# Patient Record
Sex: FEMALE
[De-identification: names, ages, dates, MRNs, and addresses within clinical notes are randomized per-mention and may not be internally consistent; named-entity substitution may affect disease eponyms.]

---

## 2022-04-26 PROBLEM — R25.2 CRAMPING OF HANDS: Status: ACTIVE | Noted: 2018-04-02

## 2022-04-26 PROBLEM — H43.391 VITREOUS FLOATER, RIGHT: Status: ACTIVE | Noted: 2021-03-12

## 2022-04-26 PROBLEM — R35.1 NOCTURIA: Status: ACTIVE | Noted: 2022-04-26

## 2022-04-26 PROBLEM — H04.123 DRY EYE SYNDROME OF BOTH EYES: Status: ACTIVE | Noted: 2021-03-12

## 2022-04-26 PROBLEM — R82.2 BILIRUBINURIA: Status: ACTIVE | Noted: 2022-04-26

## 2022-04-26 PROBLEM — G89.0 CENTRAL PAIN SYNDROME: Status: ACTIVE | Noted: 2018-04-02

## 2022-04-26 PROBLEM — I10 ESSENTIAL HYPERTENSION: Status: ACTIVE | Noted: 2018-03-04

## 2022-04-26 PROBLEM — E11.9 DM TYPE 2 WITHOUT RETINOPATHY (HCC): Status: ACTIVE | Noted: 2020-03-12

## 2022-04-26 PROBLEM — E11.00 HYPEROSMOLAR NON-KETOTIC STATE IN PATIENT WITH TYPE 2 DIABETES MELLITUS (HCC): Status: ACTIVE | Noted: 2018-03-04

## 2022-04-26 PROBLEM — E11.65 TYPE 2 DIABETES MELLITUS WITH HYPERGLYCEMIA, WITHOUT LONG-TERM CURRENT USE OF INSULIN (HCC): Status: ACTIVE | Noted: 2018-03-04

## 2022-04-26 PROBLEM — M47.812 SPONDYLOSIS OF CERVICAL REGION WITHOUT MYELOPATHY OR RADICULOPATHY: Status: ACTIVE | Noted: 2018-04-02

## 2022-04-26 PROBLEM — G24.9 DYSTONIA: Status: ACTIVE | Noted: 2018-04-02

## 2022-04-26 PROBLEM — N28.1 RENAL CYST: Status: ACTIVE | Noted: 2022-04-26

## 2022-04-26 PROBLEM — H47.333: Status: ACTIVE | Noted: 2021-03-12

## 2022-04-26 PROBLEM — H25.13 AGE-RELATED NUCLEAR CATARACT OF BOTH EYES: Status: ACTIVE | Noted: 2020-03-12

## 2022-04-26 PROBLEM — R10.9 FLANK PAIN: Status: ACTIVE | Noted: 2022-04-26

## 2023-03-08 ENCOUNTER — HOSPITAL ENCOUNTER (OUTPATIENT)
Facility: HOSPITAL | Age: 58
Setting detail: RECURRING SERIES
Discharge: HOME | End: 2023-03-11
Payer: COMMERCIAL

## 2023-03-08 PROCEDURE — 97161 PT EVAL LOW COMPLEX 20 MIN: CPT

## 2023-03-08 NOTE — PROGRESS NOTES
[facility]   [address], [address] [facility], [address] [facility]   Phone: [phone]     Fax: [phone]       Plan of Care/ Statement of Necessity for Physical Therapy Services    Patient name: Gilbert Black Start of Care: 3/8/2023   Referral source: Len Schwab MD : 1965    Medical Diagnosis: No admission diagnoses are documented for this encounter. Onset Date:3/8/22    Treatment Diagnosis: R39.89  Other signs and symptoms of genitourinary system and N90.89  OTHER SPECIFIED NONINFLAMMATORY DISORDERS OF VULVA AND PERINEUM                            Prior Hospitalization: see medical history Provider#: [id number]   Medications: Verified on Patient Summary List   Comorbidities: DM. HTN. Thyroid issues. Gallbladder resection. Stroke hx/  Prior Level of Function: Pt reports that she used to have difficulty with nocturia and increased urinary urgency but that it has been well managed since last pelvic PT POC a few months ago      The Plan of Care and following information is based on the information from the initial evaluation. Assessment/ key information: Pt was referred to PT to address nocturia and increased urinary urgency and frequency. She states that she just finished pelvic PT for this issue at a different clinic and her symptoms are well managed using HEP from that  [address]. She states that she is unsure why she is being referred to physical therapy again for this issue. Educated patient on role of pelvic PT in addressing secondary complaints including back pain and potential role in remaining urinary sxs; she declines and is not interested in participating in PT at this time. Educated pt re: bladder habits, nocturia management strategies. Pt not appropriate for PT at this time due to complaint fully managed independently.    Evaluation Complexity HistoryHIGH Complexity :3+ comorbidities / personal factors will impact the outcome/ POC  ; Examination LOW Complexity : 1-2 Standardized tests and measures addressing body structure, function, activity limitation and / or participation in recreation  ;Presentation LOW Complexity : Stable, uncomplicated  ;Clinical Decision Making LOW Complexity : FOTO score of  FOTO score = an established functional score where 100 = no disability  Overall Complexity Rating: LOW   Problem List: decrease flexibility/joint mobility   Treatment Plan may include any combination of the following: none, patient not appropriate for PT   Patient / Family readiness to learn indicated by: return verbalization   Persons(s) to be included in education: patient (P)  Barriers to Learning/Limitations: None  Measures taken if barriers to learning present: NA  Patient Goal (s): no goals, patient states that her symptoms are fully managed on hher own  Patient Self Reported Health Status: fair  Rehabilitation Potential: good    GOALS     None- patient not appropriate for PT due to primary complaint fully resolved. Frequency / Duration: patient not appropriate for PT    Patient/ Caregiver education and instruction: Diagnosis, prognosis, self care, activity modification, and exercises    [x]  Plan of care has been reviewed with PTA    Certification Period: JESUS Maria, PT 3/8/2023 8:32 AM  _____________________________________________________________________  I certify that the above Therapy Services are being furnished while the patient is under my care. I agree with the treatment plan and certify that this therapy is necessary. Physician's Signature:________________________Date:_________TIME:________                                      Len Schwab MD         ** Signature, Date and Time must be completed for valid certification **    Insurance: Payor: [id number] / Plan: [address] / Product Type: *No Product type* /      Please sign and return to :   [facility] [address], [place] [address]           Phone: [phone]      Fax:  [phone]

## 2023-03-08 NOTE — PROGRESS NOTES
PT DAILY TREATMENT NOTE/Pelvic EVAL     Patient Name: Maynor Adamson    Date: 3/8/2023    : 1965  Insurance: Payor: [address] [place] [address] [address] / Plan: [address] [place] [address] Road / Product Type: *No Product type* /      Patient  verified yes     Visit #   Current / Total 1 1   Time   In / Out 8:30am 9:30am   Pain   In / Out 0    Subjective Functional Status/Changes: See below   Changes to:  Meds, Allergies, Med Hx, Sx Hx? If yes, update Summary List no       Treatment Area: No admission diagnoses are documented for this encounter. SUBJECTIVE  []constant []intermittent []improving []worsening []no change since onset    Any medication changes, allergies to medications, adverse drug reactions, diagnosis change, or new procedure performed?: [x] No    [] Yes (see summary sheet for update)  Subjective functional status/changes:     Current complaint/symptoms:    Pt c/o nocturia and increased urinary urgency and frequency. Went to PT before for the same issue and it was getting a little better and she is not sure why urologist referred her here. States that she finished PT and does kegel exercises and cut back on liquids per advice she learned there. Worse when her back is hurting a lot; has stenosis on right side. Sxs wrap around the outside of her leg and to the front of her thigh. Went to PT for that as well and the symptoms got worse  Sometimes she gets urinary urge to wake up all night. Other nights she doesn't get signal and wets the bed. Able to delay urge to urinate for 1-2 min, day or night  3-5 glasses of water during the day. Tries to drink more  Used to walk around a lot but unable to 2/2 stenosis sxs. Doing laundry and dishes make it worse. Feels like pulling or giving way in posterior right pelvis. HODA/onset of symptoms: see above  Functional limitations: see above    Comorbidities: DM. HTN. Thyroid issues. Gallbladder resection.  Stroke hx/  Prior Level of Function: Pt reports that she used to have difficulty with nocturia and increased urinary urgency but that it has been well managed since last pelvic PT POC a few months ago    Previous treatment/compliance: already received pelvic PT for this issue and symptoms resolved  Work status: not working currently   Living situation:  WNL    Treatment goals/concerns: none  Self-reported health status: fair    Barriers to care: pt does not want to continue with PT   Substance use: tobacco  Cognition: A&O x 4     OBJECTIVE/EXAMINATION         60 min [x]Eval        - untimed        Therapeutic Procedures: Tx Min Billable or 1:1 Min (if diff from [employer]) Procedure, Rationale, Specifics          Details if applicable:              Details if applicable:            Details if applicable:            Details if applicable:            Details if applicable:     0  MC BC Totals Reminder: bill using total billable min of TIMED therapeutic procedures (example: do not include dry needle or estim unattended, both untimed codes, in totals to left)  8-22 min = 1 unit; 23-37 min = 2 units; 38-52 min = 3 units; 53-67 min = 4 units; 68-82 min = 5 units   Total Total     [x]  Patient Education billed concurrently with other procedures     Physical Therapy Evaluation- Pelvic  Musculoskeletal Screen:    Posture: Forward head, Increased thoracic kyphosis, Scapular protraction, Increased lumbar lordosis, and Increased anterior pelvic tilt    ASSESSMENT/Changes in Function: Pt was referred to PT to address nocturia and increased urinary urgency and frequency. She states that she just finished pelvic PT for this issue at a different clinic and her symptoms are well managed using HEP from that [address]. She states that she is unsure why she is being referred to physical therapy again for this issue.  Educated patient on role of pelvic PT in addressing secondary complaints including back pain and potential role in remaining urinary sxs; she declines and is not interested in participating in PT at this time. Educated pt re: bladder habits, nocturia management strategies. Pt not appropriate for PT at this time due to complaint fully managed independently. [x]  See Plan of Care  []  See progress note/recertification  [x]  See Discharge Summary         GOALS    None- patient not appropriate for PT due to primary complaint fully resolved.       PLAN  [x]  Upgrade activities as tolerated     [x]  Continue plan of care  [x]  Update interventions per flow sheet       []  Discharge due to:_  []  Other:_      Nacho Dailey, PT 3/8/2023  8:29 AM

## 2023-03-09 NOTE — PROGRESS NOTES
[facility] at the [address], [place], [address] [facility]  Phone: [phone]      Fax:  [phone]         Discharge Summary    Patient name: Bereket Gallagher Start of Care: 3/8/2023   Referral source: Jenn Torrez MD : 1965               Medical Diagnosis: No admission diagnoses are documented for this encounter. Onset Date:3/8/22               Treatment Diagnosis: R39.89  Other signs and symptoms of genitourinary system and N90.89  OTHER SPECIFIED NONINFLAMMATORY DISORDERS OF VULVA AND PERINEUM                            Prior Hospitalization: see medical history Provider#: [id number]   Medications: Verified on Patient Summary List   Comorbidities: DM. HTN. Thyroid issues. Gallbladder resection. Stroke hx/  Prior Level of Function: Pt reports that she used to have difficulty with nocturia and increased urinary urgency but that it has been well managed since last pelvic PT POC a few months ago      Visits from Start of Care: 0    Missed Visits: 0    Reporting Period : 3/8/23 to 3/8/23    Goals/Measure of Progress:  None- patient not appropriate for PT due to primary complaint fully resolved. Assessment/ Summary of Care:  Pt was referred to PT to address nocturia and increased urinary urgency and frequency. She states that she just finished pelvic PT for this issue at a different clinic and her symptoms are well managed using HEP from that  [facility] [address]. She states that she is unsure why she is being referred to physical therapy again for this issue. Educated patient on role of pelvic PT in addressing secondary complaints including back pain and potential role in remaining urinary sxs; she declines and is not interested in participating in PT at this time. Educated pt re: bladder habits, nocturia management strategies. Pt not appropriate for PT at this time due to complaint fully managed independently.      RECOMMENDATIONS:  [x]Discontinue therapy: []Patient has reached or is progressing toward set goals      [x]Patient is non-compliant or has abdicated      []Due to lack of appreciable progress towards set goals    Edson Houser, PT 3/9/2023 12:38 PM    NOTE TO PHYSICIAN:  Please complete the following and fax to: [facility] at [phone]  Retain this original for your records. If you are unable to process this request in   24 hours, please contact our office.      [] I have read the above report and request that my patient continue therapy with the following changes/special instructions:  [] I have read the above report and request that my patient be discharged from therapy    Physician's Signature:____________Date:_________TIME:________  Donnell San MD    ** Signature, Date and Time must be completed for valid certification **

## 2024-11-27 ENCOUNTER — HOSPITAL ENCOUNTER (OUTPATIENT)
Facility: HOSPITAL | Age: 59
Discharge: HOME | End: 2024-11-30
Payer: MEDICARE

## 2024-11-27 DIAGNOSIS — Z01.818 PRE-OP TESTING: Primary | ICD-10-CM

## 2024-11-27 LAB
ALBUMIN SERPL-MCNC: 3.6 G/DL (ref 3.4–5)
ALBUMIN/GLOB SERPL: 0.9 (ref 0.8–1.7)
ALP SERPL-CCNC: 95 U/L (ref 45–117)
ALT SERPL-CCNC: 26 U/L (ref 13–56)
ANION GAP SERPL CALC-SCNC: 5 MMOL/L (ref 3–18)
AST SERPL-CCNC: 22 U/L (ref 10–38)
BASOPHILS # BLD: 0 K/UL (ref 0–0.1)
BASOPHILS NFR BLD: 0 % (ref 0–2)
BILIRUB SERPL-MCNC: 0.7 MG/DL (ref 0.2–1)
BUN SERPL-MCNC: 14 MG/DL (ref 7–18)
BUN/CREAT SERPL: 13 (ref 12–20)
CALCIUM SERPL-MCNC: 9.2 MG/DL (ref 8.5–10.1)
CHLORIDE SERPL-SCNC: 106 MMOL/L (ref 100–111)
CO2 SERPL-SCNC: 33 MMOL/L (ref 21–32)
CREAT SERPL-MCNC: 1.07 MG/DL (ref 0.6–1.3)
DIFFERENTIAL METHOD BLD: ABNORMAL
EOSINOPHIL # BLD: 0.1 K/UL (ref 0–0.4)
EOSINOPHIL NFR BLD: 1 % (ref 0–5)
ERYTHROCYTE [DISTWIDTH] IN BLOOD BY AUTOMATED COUNT: 14.2 % (ref 11.6–14.5)
EST. AVERAGE GLUCOSE BLD GHB EST-MCNC: 148 MG/DL
GLOBULIN SER CALC-MCNC: 4.2 G/DL (ref 2–4)
GLUCOSE SERPL-MCNC: 131 MG/DL (ref 74–99)
HBA1C MFR BLD: 6.8 % (ref 4.2–5.6)
HCT VFR BLD AUTO: 39.5 % (ref 35–45)
HGB BLD-MCNC: 13.4 G/DL (ref 12–16)
IMM GRANULOCYTES # BLD AUTO: 0 K/UL (ref 0–0.04)
IMM GRANULOCYTES NFR BLD AUTO: 0 % (ref 0–0.5)
LYMPHOCYTES # BLD: 2 K/UL (ref 0.9–3.6)
LYMPHOCYTES NFR BLD: 21 % (ref 21–52)
MCH RBC QN AUTO: 30.2 PG (ref 24–34)
MCHC RBC AUTO-ENTMCNC: 33.9 G/DL (ref 31–37)
MCV RBC AUTO: 89.2 FL (ref 78–100)
MONOCYTES # BLD: 0.7 K/UL (ref 0.05–1.2)
MONOCYTES NFR BLD: 7 % (ref 3–10)
NEUTS SEG # BLD: 6.8 K/UL (ref 1.8–8)
NEUTS SEG NFR BLD: 71 % (ref 40–73)
NRBC # BLD: 0 K/UL (ref 0–0.01)
NRBC BLD-RTO: 0 PER 100 WBC
PLATELET # BLD AUTO: 175 K/UL (ref 135–420)
PMV BLD AUTO: 12 FL (ref 9.2–11.8)
POTASSIUM SERPL-SCNC: 3 MMOL/L (ref 3.5–5.5)
PROT SERPL-MCNC: 7.8 G/DL (ref 6.4–8.2)
RBC # BLD AUTO: 4.43 M/UL (ref 4.2–5.3)
SODIUM SERPL-SCNC: 144 MMOL/L (ref 136–145)
WBC # BLD AUTO: 9.6 K/UL (ref 4.6–13.2)

## 2024-11-27 PROCEDURE — 83036 HEMOGLOBIN GLYCOSYLATED A1C: CPT

## 2024-11-27 PROCEDURE — 85025 COMPLETE CBC W/AUTO DIFF WBC: CPT

## 2024-11-27 PROCEDURE — 93005 ELECTROCARDIOGRAM TRACING: CPT | Performed by: ORTHOPAEDIC SURGERY

## 2024-11-27 PROCEDURE — 80053 COMPREHEN METABOLIC PANEL: CPT

## 2024-11-28 LAB
EKG ATRIAL RATE: 62 BPM
EKG DIAGNOSIS: NORMAL
EKG P AXIS: 72 DEGREES
EKG P-R INTERVAL: 154 MS
EKG Q-T INTERVAL: 420 MS
EKG QRS DURATION: 76 MS
EKG QTC CALCULATION (BAZETT): 426 MS
EKG R AXIS: 76 DEGREES
EKG T AXIS: 49 DEGREES
EKG VENTRICULAR RATE: 62 BPM

## 2024-12-03 ENCOUNTER — ANESTHESIA EVENT (OUTPATIENT)
Facility: HOSPITAL | Age: 59
End: 2024-12-03
Payer: MEDICARE

## 2024-12-09 ENCOUNTER — ANESTHESIA (OUTPATIENT)
Facility: HOSPITAL | Age: 59
End: 2024-12-09
Payer: MEDICARE

## 2024-12-09 ENCOUNTER — HOSPITAL ENCOUNTER (OUTPATIENT)
Facility: HOSPITAL | Age: 59
Setting detail: OUTPATIENT SURGERY
Discharge: HOME | End: 2024-12-09
Attending: ORTHOPAEDIC SURGERY | Admitting: ORTHOPAEDIC SURGERY
Payer: MEDICARE

## 2024-12-09 VITALS
SYSTOLIC BLOOD PRESSURE: 139 MMHG | WEIGHT: 208.19 LBS | HEART RATE: 76 BPM | RESPIRATION RATE: 15 BRPM | DIASTOLIC BLOOD PRESSURE: 59 MMHG | OXYGEN SATURATION: 95 % | HEIGHT: 66 IN | BODY MASS INDEX: 33.46 KG/M2 | TEMPERATURE: 97 F

## 2024-12-09 DIAGNOSIS — M23.204 OLD COMPLEX TEAR OF MEDIAL MENISCUS OF LEFT KNEE: Primary | Chronic | ICD-10-CM

## 2024-12-09 LAB
GLUCOSE BLD STRIP.AUTO-MCNC: 164 MG/DL (ref 70–110)
GLUCOSE BLD STRIP.AUTO-MCNC: 175 MG/DL (ref 70–110)
POTASSIUM SERPL-SCNC: 3 MMOL/L (ref 3.5–5.5)

## 2024-12-09 PROCEDURE — 2709999900 HC NON-CHARGEABLE SUPPLY: Performed by: ORTHOPAEDIC SURGERY

## 2024-12-09 PROCEDURE — 6360000002 HC RX W HCPCS

## 2024-12-09 PROCEDURE — 3600000002 HC SURGERY LEVEL 2 BASE: Performed by: ORTHOPAEDIC SURGERY

## 2024-12-09 PROCEDURE — 7100000011 HC PHASE II RECOVERY - ADDTL 15 MIN: Performed by: ORTHOPAEDIC SURGERY

## 2024-12-09 PROCEDURE — 84132 ASSAY OF SERUM POTASSIUM: CPT

## 2024-12-09 PROCEDURE — 82962 GLUCOSE BLOOD TEST: CPT

## 2024-12-09 PROCEDURE — 3600000012 HC SURGERY LEVEL 2 ADDTL 15MIN: Performed by: ORTHOPAEDIC SURGERY

## 2024-12-09 PROCEDURE — 2580000003 HC RX 258: Performed by: ANESTHESIOLOGY

## 2024-12-09 PROCEDURE — 3700000001 HC ADD 15 MINUTES (ANESTHESIA): Performed by: ORTHOPAEDIC SURGERY

## 2024-12-09 PROCEDURE — 7100000001 HC PACU RECOVERY - ADDTL 15 MIN: Performed by: ORTHOPAEDIC SURGERY

## 2024-12-09 PROCEDURE — 2580000003 HC RX 258: Performed by: ORTHOPAEDIC SURGERY

## 2024-12-09 PROCEDURE — 6360000002 HC RX W HCPCS: Performed by: ORTHOPAEDIC SURGERY

## 2024-12-09 PROCEDURE — 7100000000 HC PACU RECOVERY - FIRST 15 MIN: Performed by: ORTHOPAEDIC SURGERY

## 2024-12-09 PROCEDURE — 36415 COLL VENOUS BLD VENIPUNCTURE: CPT

## 2024-12-09 PROCEDURE — 6360000002 HC RX W HCPCS: Performed by: NURSE ANESTHETIST, CERTIFIED REGISTERED

## 2024-12-09 PROCEDURE — 2500000003 HC RX 250 WO HCPCS

## 2024-12-09 PROCEDURE — 3700000000 HC ANESTHESIA ATTENDED CARE: Performed by: ORTHOPAEDIC SURGERY

## 2024-12-09 PROCEDURE — 7100000010 HC PHASE II RECOVERY - FIRST 15 MIN: Performed by: ORTHOPAEDIC SURGERY

## 2024-12-09 RX ORDER — HYDROMORPHONE HYDROCHLORIDE 1 MG/ML
0.5 INJECTION, SOLUTION INTRAMUSCULAR; INTRAVENOUS; SUBCUTANEOUS EVERY 5 MIN PRN
Status: DISCONTINUED | OUTPATIENT
Start: 2024-12-09 | End: 2024-12-09 | Stop reason: HOSPADM

## 2024-12-09 RX ORDER — MORPHINE SULFATE 4 MG/ML
2 INJECTION, SOLUTION INTRAMUSCULAR; INTRAVENOUS
Status: CANCELLED | OUTPATIENT
Start: 2024-12-09

## 2024-12-09 RX ORDER — NALOXONE HYDROCHLORIDE 0.4 MG/ML
INJECTION, SOLUTION INTRAMUSCULAR; INTRAVENOUS; SUBCUTANEOUS PRN
Status: DISCONTINUED | OUTPATIENT
Start: 2024-12-09 | End: 2024-12-09 | Stop reason: HOSPADM

## 2024-12-09 RX ORDER — FENTANYL CITRATE 50 UG/ML
INJECTION, SOLUTION INTRAMUSCULAR; INTRAVENOUS
Status: DISCONTINUED | OUTPATIENT
Start: 2024-12-09 | End: 2024-12-09 | Stop reason: SDUPTHER

## 2024-12-09 RX ORDER — EPHEDRINE SULFATE/0.9% NACL/PF 50 MG/5 ML
SYRINGE (ML) INTRAVENOUS
Status: DISCONTINUED | OUTPATIENT
Start: 2024-12-09 | End: 2024-12-09 | Stop reason: SDUPTHER

## 2024-12-09 RX ORDER — LIDOCAINE HYDROCHLORIDE 20 MG/ML
INJECTION, SOLUTION EPIDURAL; INFILTRATION; INTRACAUDAL; PERINEURAL
Status: DISCONTINUED | OUTPATIENT
Start: 2024-12-09 | End: 2024-12-09 | Stop reason: SDUPTHER

## 2024-12-09 RX ORDER — ONDANSETRON 2 MG/ML
4 INJECTION INTRAMUSCULAR; INTRAVENOUS EVERY 6 HOURS PRN
Status: CANCELLED | OUTPATIENT
Start: 2024-12-09

## 2024-12-09 RX ORDER — PROCHLORPERAZINE EDISYLATE 5 MG/ML
5 INJECTION INTRAMUSCULAR; INTRAVENOUS
Status: DISCONTINUED | OUTPATIENT
Start: 2024-12-09 | End: 2024-12-09 | Stop reason: HOSPADM

## 2024-12-09 RX ORDER — MORPHINE SULFATE 4 MG/ML
INJECTION, SOLUTION INTRAMUSCULAR; INTRAVENOUS PRN
Status: DISCONTINUED | OUTPATIENT
Start: 2024-12-09 | End: 2024-12-09 | Stop reason: ALTCHOICE

## 2024-12-09 RX ORDER — SODIUM CHLORIDE 0.9 % (FLUSH) 0.9 %
5-40 SYRINGE (ML) INJECTION EVERY 12 HOURS SCHEDULED
Status: DISCONTINUED | OUTPATIENT
Start: 2024-12-09 | End: 2024-12-09 | Stop reason: HOSPADM

## 2024-12-09 RX ORDER — ONDANSETRON 2 MG/ML
INJECTION INTRAMUSCULAR; INTRAVENOUS
Status: DISCONTINUED | OUTPATIENT
Start: 2024-12-09 | End: 2024-12-09 | Stop reason: SDUPTHER

## 2024-12-09 RX ORDER — IPRATROPIUM BROMIDE AND ALBUTEROL SULFATE 2.5; .5 MG/3ML; MG/3ML
1 SOLUTION RESPIRATORY (INHALATION)
Status: DISCONTINUED | OUTPATIENT
Start: 2024-12-09 | End: 2024-12-09 | Stop reason: HOSPADM

## 2024-12-09 RX ORDER — MIDAZOLAM HYDROCHLORIDE 1 MG/ML
INJECTION, SOLUTION INTRAMUSCULAR; INTRAVENOUS
Status: DISCONTINUED | OUTPATIENT
Start: 2024-12-09 | End: 2024-12-09 | Stop reason: SDUPTHER

## 2024-12-09 RX ORDER — OXYCODONE HYDROCHLORIDE 5 MG/1
10 TABLET ORAL EVERY 4 HOURS PRN
Status: CANCELLED | OUTPATIENT
Start: 2024-12-09

## 2024-12-09 RX ORDER — PROPOFOL 10 MG/ML
INJECTION, EMULSION INTRAVENOUS
Status: DISCONTINUED | OUTPATIENT
Start: 2024-12-09 | End: 2024-12-09 | Stop reason: SDUPTHER

## 2024-12-09 RX ORDER — FENTANYL CITRATE 50 UG/ML
25 INJECTION, SOLUTION INTRAMUSCULAR; INTRAVENOUS EVERY 5 MIN PRN
Status: DISCONTINUED | OUTPATIENT
Start: 2024-12-09 | End: 2024-12-09 | Stop reason: HOSPADM

## 2024-12-09 RX ORDER — GLYCOPYRROLATE 0.2 MG/ML
INJECTION INTRAMUSCULAR; INTRAVENOUS
Status: DISCONTINUED | OUTPATIENT
Start: 2024-12-09 | End: 2024-12-09 | Stop reason: SDUPTHER

## 2024-12-09 RX ORDER — OXYCODONE HYDROCHLORIDE 5 MG/1
10 TABLET ORAL PRN
Status: DISCONTINUED | OUTPATIENT
Start: 2024-12-09 | End: 2024-12-09 | Stop reason: HOSPADM

## 2024-12-09 RX ORDER — PHENYLEPHRINE HYDROCHLORIDE 10 MG/ML
INJECTION INTRAVENOUS
Status: DISCONTINUED | OUTPATIENT
Start: 2024-12-09 | End: 2024-12-09 | Stop reason: SDUPTHER

## 2024-12-09 RX ORDER — OXYCODONE HYDROCHLORIDE 5 MG/1
5 TABLET ORAL PRN
Status: DISCONTINUED | OUTPATIENT
Start: 2024-12-09 | End: 2024-12-09 | Stop reason: HOSPADM

## 2024-12-09 RX ORDER — MORPHINE SULFATE 4 MG/ML
4 INJECTION, SOLUTION INTRAMUSCULAR; INTRAVENOUS
Status: CANCELLED | OUTPATIENT
Start: 2024-12-09

## 2024-12-09 RX ORDER — LIDOCAINE HYDROCHLORIDE AND EPINEPHRINE 10; 10 MG/ML; UG/ML
INJECTION, SOLUTION INFILTRATION; PERINEURAL PRN
Status: DISCONTINUED | OUTPATIENT
Start: 2024-12-09 | End: 2024-12-09 | Stop reason: ALTCHOICE

## 2024-12-09 RX ORDER — SODIUM CHLORIDE 0.9 % (FLUSH) 0.9 %
5-40 SYRINGE (ML) INJECTION PRN
Status: DISCONTINUED | OUTPATIENT
Start: 2024-12-09 | End: 2024-12-09 | Stop reason: HOSPADM

## 2024-12-09 RX ORDER — SODIUM CHLORIDE 9 MG/ML
INJECTION, SOLUTION INTRAVENOUS CONTINUOUS
Status: DISCONTINUED | OUTPATIENT
Start: 2024-12-09 | End: 2024-12-09

## 2024-12-09 RX ORDER — SODIUM CHLORIDE 9 MG/ML
INJECTION, SOLUTION INTRAVENOUS PRN
Status: DISCONTINUED | OUTPATIENT
Start: 2024-12-09 | End: 2024-12-09 | Stop reason: HOSPADM

## 2024-12-09 RX ORDER — HYDROCODONE BITARTRATE AND ACETAMINOPHEN 5; 325 MG/1; MG/1
1 TABLET ORAL EVERY 4 HOURS PRN
Qty: 18 TABLET | Refills: 0 | Status: SHIPPED | OUTPATIENT
Start: 2024-12-09 | End: 2024-12-12

## 2024-12-09 RX ORDER — OXYCODONE HYDROCHLORIDE 5 MG/1
5 TABLET ORAL EVERY 4 HOURS PRN
Status: CANCELLED | OUTPATIENT
Start: 2024-12-09

## 2024-12-09 RX ORDER — DEXAMETHASONE SODIUM PHOSPHATE 4 MG/ML
INJECTION, SOLUTION INTRA-ARTICULAR; INTRALESIONAL; INTRAMUSCULAR; INTRAVENOUS; SOFT TISSUE
Status: DISCONTINUED | OUTPATIENT
Start: 2024-12-09 | End: 2024-12-09 | Stop reason: SDUPTHER

## 2024-12-09 RX ORDER — SODIUM CHLORIDE 9 MG/ML
INJECTION, SOLUTION INTRAVENOUS CONTINUOUS
Status: DISCONTINUED | OUTPATIENT
Start: 2024-12-09 | End: 2024-12-09 | Stop reason: HOSPADM

## 2024-12-09 RX ADMIN — PROPOFOL 200 MG: 10 INJECTION, EMULSION INTRAVENOUS at 08:17

## 2024-12-09 RX ADMIN — LIDOCAINE HYDROCHLORIDE 100 MG: 20 INJECTION, SOLUTION EPIDURAL; INFILTRATION; INTRACAUDAL; PERINEURAL at 08:17

## 2024-12-09 RX ADMIN — Medication 10 MG: at 08:50

## 2024-12-09 RX ADMIN — FENTANYL CITRATE 25 MCG: 50 INJECTION, SOLUTION INTRAMUSCULAR; INTRAVENOUS at 08:26

## 2024-12-09 RX ADMIN — WATER 2000 MG: 1 INJECTION INTRAMUSCULAR; INTRAVENOUS; SUBCUTANEOUS at 08:20

## 2024-12-09 RX ADMIN — SODIUM CHLORIDE: 9 INJECTION, SOLUTION INTRAVENOUS at 07:57

## 2024-12-09 RX ADMIN — GLYCOPYRROLATE 0.2 MG: 0.2 INJECTION INTRAMUSCULAR; INTRAVENOUS at 08:37

## 2024-12-09 RX ADMIN — FENTANYL CITRATE 25 MCG: 50 INJECTION, SOLUTION INTRAMUSCULAR; INTRAVENOUS at 08:22

## 2024-12-09 RX ADMIN — MIDAZOLAM 2 MG: 1 INJECTION INTRAMUSCULAR; INTRAVENOUS at 08:12

## 2024-12-09 RX ADMIN — HYDROMORPHONE HYDROCHLORIDE 0.5 MG: 1 INJECTION, SOLUTION INTRAMUSCULAR; INTRAVENOUS; SUBCUTANEOUS at 08:29

## 2024-12-09 RX ADMIN — SODIUM CHLORIDE 250 ML/HR: 9 INJECTION, SOLUTION INTRAVENOUS at 08:57

## 2024-12-09 RX ADMIN — ONDANSETRON 4 MG: 2 INJECTION INTRAMUSCULAR; INTRAVENOUS at 08:28

## 2024-12-09 RX ADMIN — DEXAMETHASONE SODIUM PHOSPHATE 4 MG: 4 INJECTION INTRA-ARTICULAR; INTRALESIONAL; INTRAMUSCULAR; INTRAVENOUS; SOFT TISSUE at 08:17

## 2024-12-09 RX ADMIN — Medication 5 MG: at 08:42

## 2024-12-09 RX ADMIN — FENTANYL CITRATE 25 MCG: 50 INJECTION, SOLUTION INTRAMUSCULAR; INTRAVENOUS at 08:27

## 2024-12-09 RX ADMIN — Medication 5 MG: at 08:52

## 2024-12-09 RX ADMIN — PHENYLEPHRINE HYDROCHLORIDE 100 MCG: 10 INJECTION INTRAVENOUS at 08:52

## 2024-12-09 RX ADMIN — Medication 5 MG: at 08:37

## 2024-12-09 RX ADMIN — FENTANYL CITRATE 25 MCG: 50 INJECTION, SOLUTION INTRAMUSCULAR; INTRAVENOUS at 08:23

## 2024-12-09 ASSESSMENT — PAIN DESCRIPTION - DESCRIPTORS: DESCRIPTORS: ACHING

## 2024-12-09 ASSESSMENT — PAIN SCALES - GENERAL
PAINLEVEL_OUTOF10: 0
PAINLEVEL_OUTOF10: 0

## 2024-12-09 ASSESSMENT — LIFESTYLE VARIABLES: SMOKING_STATUS: 1

## 2024-12-09 ASSESSMENT — PAIN - FUNCTIONAL ASSESSMENT: PAIN_FUNCTIONAL_ASSESSMENT: 0-10

## 2024-12-09 NOTE — PERIOP NOTE
TRANSFER - IN REPORT:    Verbal report received from NIKHIL Weaver RN on Bela Sharpe  being received from PACU for routine post-op      Report consisted of patient's Situation, Background, Assessment and   Recommendations(SBAR).     Information from the following report(s) Nurse Handoff Report, Index, Surgery Report, Intake/Output, and MAR was reviewed with the receiving nurse.    Opportunity for questions and clarification was provided.      Assessment completed upon patient's arrival to unit and care assumed.

## 2024-12-09 NOTE — ANESTHESIA PRE PROCEDURE
Department of Anesthesiology  Preprocedure Note       Name:  Bela Sharpe   Age:  59 y.o.  :  1965                                          MRN:  [id number]         Date:  2024      Surgeon: Surgeon(s):  Noah Shaffer MD    Procedure: Procedure(s):  LEFT KNEE ARTHROSCOPIC PARTIAL MEDIAL MENISCECTOMY    Medications prior to admission:   Prior to Admission medications    Medication Sig Start Date End Date Taking? Authorizing Provider   HYDROcodone-acetaminophen (NORCO) 5-325 MG per tablet Take 1 tablet by mouth every 4 hours as needed for Pain for up to 3 days. Intended supply: 3 days. Take lowest dose possible to manage pain Max Daily Amount: 6 tablets 24 Yes Noah Shaffer MD   atorvastatin (LIPITOR) 40 MG tablet Take 1 tablet by mouth at bedtime   Yes Ton Bruno MD   doxazosin (CARDURA) 4 MG tablet Take 1 tablet by mouth nightly   Yes ProviderTon MD   ASPIRIN LOW DOSE 81 MG EC tablet 1 tablet daily 23  Yes Ton Bruno MD   Multiple Vitamin (MULTIVITAMIN) TABS TAKE 1 TABLET BY MOUTH EVERY DAY 2/10/23  Yes ProviderTon MD   amLODIPine (NORVASC) 10 MG tablet Take by mouth daily   Yes Automatic Reconciliation, Ar   atenolol (TENORMIN) 50 MG tablet Take by mouth at bedtime   Yes Automatic Reconciliation, Ar   furosemide (LASIX) 20 MG tablet Take by mouth daily Indications: Fluid Retention in the Legs   Yes Automatic Reconciliation, Ar   insulin glargine (LANTUS SOLOSTAR) 100 UNIT/ML injection pen Inject into the skin daily 26 units   Yes Automatic Reconciliation, Ar   insulin lispro (HUMALOG) 100 UNIT/ML SOLN injection vial 5 Units 3 times daily (before meals) 22  Yes Automatic Reconciliation, Ar   lisinopril (PRINIVIL;ZESTRIL) 40 MG tablet Take by mouth daily   Yes Automatic Reconciliation, Ar   trospium (SANCTURA) 20 MG tablet Take 1 tablet by mouth 2 times daily (before meals) 22  Yes Automatic Reconciliation, Ar   ascorbic acid

## 2024-12-09 NOTE — PERIOP NOTE
Albina MARRUFO at the bed side treating low BP.  TRANSFER - IN REPORT:    Verbal report received from Vahid Montemayor CRNA RN  on Bela Sharpe  being received from OR for routine progression of patient care      Report consisted of patient's Situation, Background, Assessment and   Recommendations(SBAR).     Information from the following report(s) Adult Overview, Surgery Report, Intake/Output, and MAR was reviewed with the receiving nurse.    Opportunity for questions and clarification was provided.      Assessment completed upon patient's arrival to unit and care assumed.

## 2024-12-09 NOTE — ANESTHESIA POSTPROCEDURE EVALUATION
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 [M23.222])    Surgeons: Noah Shaffer MD Responsible Provider: Sammy Gibbs MD    Anesthesia Type: General ASA Status: 3            Anesthesia Type: General    Artur Phase I: Artur Score: 10    Artur Phase II:      Anesthesia Post Evaluation    Patient location during evaluation: PACU  Patient participation: complete - patient participated  Level of consciousness: awake and alert  Pain score: 0  Airway patency: patent  Nausea & Vomiting: no nausea and no vomiting  Cardiovascular status: blood pressure returned to baseline  Respiratory status: acceptable  Hydration status: euvolemic  Multimodal analgesia pain management approach  Pain management: adequate        No notable events documented.

## 2024-12-09 NOTE — OP NOTE
MD PRETTY JOHNS/JESSICA  D:  12/09/2024 08:43:14  T:  12/09/2024 13:06:31  JOB #:  [id number]

## 2024-12-09 NOTE — PERIOP NOTE
Pt took 81 mg aspirin yesterday at 08 am. Dr. Shaffer notified, and Dr. Shaffer is ok to proceed. No new order received.

## 2024-12-09 NOTE — PERIOP NOTE
TRANSFER - OUT REPORT:    Verbal report given to Ashlee BLANCO  on Bela Sharpe  being transferred to Phase II  for routine progression of patient care       Report consisted of patient's Situation, Background, Assessment and   Recommendations(SBAR).     Information from the following report(s) Adult Overview, Surgery Report, Intake/Output, and MAR was reviewed with the receiving nurse.         Intake/Output Summary (Last 24 hours) at 12/9/2024 0922  Last data filed at 12/9/2024 0909  Gross per 24 hour   Intake 1000 ml   Output 5 ml   Net 995 ml       Lines:   Peripheral IV 12/09/24 Posterior;Right Hand (Active)   Site Assessment Clean, dry & intact 12/09/24 0856   Line Status Infusing 12/09/24 0856   Line Care Connections checked and tightened 12/09/24 0856   Phlebitis Assessment No symptoms 12/09/24 0856   Infiltration Assessment 0 12/09/24 0856   Alcohol Cap Used No 12/09/24 0755   Dressing Status Clean, dry & intact 12/09/24 0856   Dressing Type Transparent 12/09/24 0856   Dressing Intervention New 12/09/24 0755        Opportunity for questions and clarification was provided.      Patient transported with:  Registered Nurse

## 2024-12-09 NOTE — DISCHARGE INSTRUCTIONS
DISCHARGE SUMMARY from Nurse    PATIENT INSTRUCTIONS:    After general anesthesia or intravenous sedation, for 24 hours or while taking prescription Narcotics:  Limit your activities  Do not drive and operate hazardous machinery  Do not make important personal or business decisions  Do  not drink alcoholic beverages  If you have not urinated within 8 hours after discharge, please contact your surgeon on call.    Report the following to your surgeon:  Excessive pain, swelling, redness or odor of or around the surgical area  Temperature over 100.5  Nausea and vomiting lasting longer than 4 hours or if unable to take medications  Any signs of decreased circulation or nerve impairment to extremity: change in color, persistent  numbness, tingling, coldness or increase pain  Any questions    What to do at Home:  Recommended activity: no lifting, Driving, or Strenuous exercise until advised.    If you experience any of the following symptoms above, please follow up with Dr Shaffer.    *  Please give a list of your current medications to your Primary Care Provider.    *  Please update this list whenever your medications are discontinued, doses are      changed, or new medications (including over-the-counter products) are added.    *  Please carry medication information at all times in case of emergency situations.    These are general instructions for a healthy lifestyle:    No smoking/ No tobacco products/ Avoid exposure to second hand smoke  Surgeon General's Warning:  Quitting smoking now greatly reduces serious risk to your health.    Obesity, smoking, and sedentary lifestyle greatly increases your risk for illness    A healthy diet, regular physical exercise & weight monitoring are important for maintaining a healthy lifestyle    You may be retaining fluid if you have a history of heart failure or if you experience any of the following symptoms:  Weight gain of 3 pounds or more overnight or 5 pounds in a week,

## 2024-12-09 NOTE — H&P
History and Physical        Patient: Bela Sharpe               Sex: female          DOA: 12/9/2024         YOB: 1965      Age:  59 y.o.        LOS:  LOS: 0 days        HPI:     Bela Sharpe is a 59 y.o. female who has a left knee MMT despite non-op therapy    Past Medical History:   Diagnosis Date    Dry eye     Shree Romero    Environmental allergies     History of being hospitalized 03/04/2018    discharged 3/8/18 increasing frequency right upper extremity spasms, improved after ativan, given keppra, neurology consulted, CT negative, acute encephalopathy, continue keppra, follow up with neurology, statin therapy, antiplatelet therapy, stroke workup and  vascular study and echo nonrevealing, outpatient PT recommended    HTN (hypertension) 2018    \"probably over ten years\" previously followed by Nadir Laura    Renal impairment     Simple partial seizures (HCC) 03/2018    not taking any medication, previously treated with keppra, previously followed by Makeda Naranjo    Simple renal cyst     at least since 2022, previously followed by Madalyn Howard    Stroke (HCC) 02/2018    lower back pain as residuals, not being followed by any specialists    Type 2 diabetes mellitus (HCC) 2018    Donal Laura       Past Surgical History:   Procedure Laterality Date    CATARACT REMOVAL WITH IMPLANT Left 08/12/2024    CATARACT REMOVAL WITH IMPLANT Right 05/15/2023    CHOLECYSTECTOMY  2010    COLONOSCOPY  09/05/2024    COLONOSCOPY  07/31/2019    SALPINGECTOMY      1995 or 1996       Family History   Problem Relation Age of Onset    Prostate Cancer Father        Social History     Socioeconomic History    Marital status: Legally      Spouse name: None    Number of children: None    Years of education: None    Highest education level: None   Tobacco Use    Smoking status: Every Day     Types: Cigarettes    Smokeless tobacco: Never   Vaping Use    Vaping status: Never Used

## (undated) DEVICE — APPLICATOR MEDICATED 26 CC SOLUTION HI LT ORNG CHLORAPREP

## (undated) DEVICE — TUBING PMP L8FT LNG W/ CONN FOR AR-6400 REDEUCE

## (undated) DEVICE — SOLUTION IRRIG 3000ML LAC RINGERS ARTHROMTC PLAS CONT

## (undated) DEVICE — GARMENT,MEDLINE,DVT,INT,CALF,MED, GEN2: Brand: MEDLINE

## (undated) DEVICE — 4.5 MM INCISOR PLUS STRAIGHT                                    BLADES, POWER/EP-1, VIOLET, PACKAGED                                    6 PER BOX, STERILE

## (undated) DEVICE — HYPODERMIC SAFETY NEEDLE: Brand: MAGELLAN

## (undated) DEVICE — Device

## (undated) DEVICE — TUBE IRRIG L8IN LNG PT W/ CONN FOR PMP SYS REDEUCE

## (undated) DEVICE — GLOVE ORANGE PI 8   MSG9080

## (undated) DEVICE — SUTURE ETHILON SZ 3-0 L18IN NONABSORBABLE BLK PS-2 L19MM 3/8 1669H

## (undated) DEVICE — GLOVE SURG SZ 7 L12IN FNGR THK79MIL GRN LTX FREE

## (undated) DEVICE — GLOVE SURG SZ 7 CRM LTX FREE POLYISOPRENE POLYMER BEAD ANTI